# Patient Record
Sex: MALE | Race: WHITE | NOT HISPANIC OR LATINO | Employment: FULL TIME | URBAN - METROPOLITAN AREA
[De-identification: names, ages, dates, MRNs, and addresses within clinical notes are randomized per-mention and may not be internally consistent; named-entity substitution may affect disease eponyms.]

---

## 2024-08-09 ENCOUNTER — LAB (OUTPATIENT)
Dept: LAB | Facility: LAB | Age: 39
End: 2024-08-09

## 2024-08-09 ENCOUNTER — APPOINTMENT (OUTPATIENT)
Dept: PRIMARY CARE | Facility: CLINIC | Age: 39
End: 2024-08-09

## 2024-08-09 ENCOUNTER — HOSPITAL ENCOUNTER (OUTPATIENT)
Dept: RADIOLOGY | Facility: CLINIC | Age: 39
Discharge: HOME | End: 2024-08-09

## 2024-08-09 VITALS
OXYGEN SATURATION: 99 % | DIASTOLIC BLOOD PRESSURE: 104 MMHG | HEIGHT: 64 IN | RESPIRATION RATE: 15 BRPM | BODY MASS INDEX: 27.83 KG/M2 | HEART RATE: 85 BPM | SYSTOLIC BLOOD PRESSURE: 155 MMHG | TEMPERATURE: 97.3 F | WEIGHT: 163 LBS

## 2024-08-09 DIAGNOSIS — M54.50 ACUTE BILATERAL LOW BACK PAIN WITHOUT SCIATICA: ICD-10-CM

## 2024-08-09 DIAGNOSIS — N20.0 RENAL CALCULUS: ICD-10-CM

## 2024-08-09 DIAGNOSIS — R10.9 BILATERAL FLANK PAIN: ICD-10-CM

## 2024-08-09 DIAGNOSIS — Z87.19 HISTORY OF GASTROESOPHAGEAL REFLUX (GERD): ICD-10-CM

## 2024-08-09 DIAGNOSIS — Z00.00 ANNUAL PHYSICAL EXAM: Primary | ICD-10-CM

## 2024-08-09 DIAGNOSIS — Z00.00 ANNUAL PHYSICAL EXAM: ICD-10-CM

## 2024-08-09 DIAGNOSIS — R10.9 STOMACH PAIN: ICD-10-CM

## 2024-08-09 LAB
25(OH)D3 SERPL-MCNC: 35 NG/ML (ref 30–100)
ALBUMIN SERPL BCP-MCNC: 4.7 G/DL (ref 3.4–5)
ALP SERPL-CCNC: 53 U/L (ref 33–120)
ALT SERPL W P-5'-P-CCNC: 13 U/L (ref 10–52)
ANION GAP SERPL CALC-SCNC: 14 MMOL/L (ref 10–20)
APPEARANCE UR: CLEAR
AST SERPL W P-5'-P-CCNC: 14 U/L (ref 9–39)
BILIRUB SERPL-MCNC: 0.5 MG/DL (ref 0–1.2)
BILIRUB UR STRIP.AUTO-MCNC: NEGATIVE MG/DL
BUN SERPL-MCNC: 16 MG/DL (ref 6–23)
CALCIUM SERPL-MCNC: 9.8 MG/DL (ref 8.6–10.3)
CHLORIDE SERPL-SCNC: 104 MMOL/L (ref 98–107)
CHOLEST SERPL-MCNC: 223 MG/DL (ref 0–199)
CHOLESTEROL/HDL RATIO: 5.5
CO2 SERPL-SCNC: 28 MMOL/L (ref 21–32)
COLOR UR: ABNORMAL
CREAT SERPL-MCNC: 1.14 MG/DL (ref 0.5–1.3)
EGFRCR SERPLBLD CKD-EPI 2021: 83 ML/MIN/1.73M*2
ERYTHROCYTE [DISTWIDTH] IN BLOOD BY AUTOMATED COUNT: 12.9 % (ref 11.5–14.5)
EST. AVERAGE GLUCOSE BLD GHB EST-MCNC: 103 MG/DL
GLUCOSE SERPL-MCNC: 90 MG/DL (ref 74–99)
GLUCOSE UR STRIP.AUTO-MCNC: NORMAL MG/DL
HBA1C MFR BLD: 5.2 %
HCT VFR BLD AUTO: 45.9 % (ref 41–52)
HDLC SERPL-MCNC: 40.6 MG/DL
HGB BLD-MCNC: 15.6 G/DL (ref 13.5–17.5)
KETONES UR STRIP.AUTO-MCNC: ABNORMAL MG/DL
LDLC SERPL CALC-MCNC: 149 MG/DL
LEUKOCYTE ESTERASE UR QL STRIP.AUTO: NEGATIVE
MCH RBC QN AUTO: 29.2 PG (ref 26–34)
MCHC RBC AUTO-ENTMCNC: 34 G/DL (ref 32–36)
MCV RBC AUTO: 86 FL (ref 80–100)
NITRITE UR QL STRIP.AUTO: NEGATIVE
NON HDL CHOLESTEROL: 182 MG/DL (ref 0–149)
NRBC BLD-RTO: 0 /100 WBCS (ref 0–0)
PH UR STRIP.AUTO: 6 [PH]
PLATELET # BLD AUTO: 249 X10*3/UL (ref 150–450)
POTASSIUM SERPL-SCNC: 4.1 MMOL/L (ref 3.5–5.3)
PROT SERPL-MCNC: 7.3 G/DL (ref 6.4–8.2)
PROT UR STRIP.AUTO-MCNC: NEGATIVE MG/DL
RBC # BLD AUTO: 5.35 X10*6/UL (ref 4.5–5.9)
RBC # UR STRIP.AUTO: NEGATIVE /UL
SODIUM SERPL-SCNC: 142 MMOL/L (ref 136–145)
SP GR UR STRIP.AUTO: 1.01
TRIGL SERPL-MCNC: 167 MG/DL (ref 0–149)
TSH SERPL-ACNC: 3.64 MIU/L (ref 0.44–3.98)
UROBILINOGEN UR STRIP.AUTO-MCNC: NORMAL MG/DL
VLDL: 33 MG/DL (ref 0–40)
WBC # BLD AUTO: 4.4 X10*3/UL (ref 4.4–11.3)

## 2024-08-09 PROCEDURE — 84443 ASSAY THYROID STIM HORMONE: CPT

## 2024-08-09 PROCEDURE — 76770 US EXAM ABDO BACK WALL COMP: CPT

## 2024-08-09 PROCEDURE — 83036 HEMOGLOBIN GLYCOSYLATED A1C: CPT

## 2024-08-09 PROCEDURE — 1036F TOBACCO NON-USER: CPT

## 2024-08-09 PROCEDURE — 82306 VITAMIN D 25 HYDROXY: CPT

## 2024-08-09 PROCEDURE — 36415 COLL VENOUS BLD VENIPUNCTURE: CPT

## 2024-08-09 PROCEDURE — 85027 COMPLETE CBC AUTOMATED: CPT

## 2024-08-09 PROCEDURE — 3008F BODY MASS INDEX DOCD: CPT

## 2024-08-09 PROCEDURE — 80053 COMPREHEN METABOLIC PANEL: CPT

## 2024-08-09 PROCEDURE — 80061 LIPID PANEL: CPT

## 2024-08-09 PROCEDURE — 81003 URINALYSIS AUTO W/O SCOPE: CPT

## 2024-08-09 PROCEDURE — 99204 OFFICE O/P NEW MOD 45 MIN: CPT

## 2024-08-09 PROCEDURE — 99396 PREV VISIT EST AGE 40-64: CPT

## 2024-08-09 PROCEDURE — 83013 H PYLORI (C-13) BREATH: CPT

## 2024-08-09 SDOH — ECONOMIC STABILITY: FOOD INSECURITY: WITHIN THE PAST 12 MONTHS, THE FOOD YOU BOUGHT JUST DIDN'T LAST AND YOU DIDN'T HAVE MONEY TO GET MORE.: NEVER TRUE

## 2024-08-09 SDOH — ECONOMIC STABILITY: FOOD INSECURITY: WITHIN THE PAST 12 MONTHS, YOU WORRIED THAT YOUR FOOD WOULD RUN OUT BEFORE YOU GOT MONEY TO BUY MORE.: NEVER TRUE

## 2024-08-09 ASSESSMENT — ENCOUNTER SYMPTOMS
UNEXPECTED WEIGHT CHANGE: 1
EYES NEGATIVE: 1
VOMITING: 1
FLANK PAIN: 1
ENDOCRINE NEGATIVE: 1
CARDIOVASCULAR NEGATIVE: 1
ABDOMINAL PAIN: 1
HEMATOLOGIC/LYMPHATIC NEGATIVE: 1
ALLERGIC/IMMUNOLOGIC NEGATIVE: 1
NAUSEA: 1
FEVER: 0
RESPIRATORY NEGATIVE: 1
PSYCHIATRIC NEGATIVE: 1
NEUROLOGICAL NEGATIVE: 1

## 2024-08-09 ASSESSMENT — PATIENT HEALTH QUESTIONNAIRE - PHQ9
1. LITTLE INTEREST OR PLEASURE IN DOING THINGS: NOT AT ALL
SUM OF ALL RESPONSES TO PHQ9 QUESTIONS 1 & 2: 0
2. FEELING DOWN, DEPRESSED OR HOPELESS: NOT AT ALL

## 2024-08-09 ASSESSMENT — PAIN SCALES - GENERAL: PAINLEVEL: 0-NO PAIN

## 2024-08-09 ASSESSMENT — LIFESTYLE VARIABLES
HOW OFTEN DO YOU HAVE SIX OR MORE DRINKS ON ONE OCCASION: NEVER
AUDIT-C TOTAL SCORE: 0
HOW OFTEN DO YOU HAVE A DRINK CONTAINING ALCOHOL: NEVER
HOW MANY STANDARD DRINKS CONTAINING ALCOHOL DO YOU HAVE ON A TYPICAL DAY: PATIENT DOES NOT DRINK
SKIP TO QUESTIONS 9-10: 1

## 2024-08-09 NOTE — PATIENT INSTRUCTIONS
Thank you for coming to see me today.  If you have any questions or concerns following our visit, please contact the office.  Phone: [phone]    Follow up with me in 6 months    1)  Monitor your BP at home over the weekend and let me know what the readings were on Monday    2) Complete renal ultrasound and fasting lab work- I will reach out with resutls    3) I have placed referrals for GI and urology

## 2024-08-09 NOTE — PROGRESS NOTES
Subjective   Patient ID: Nishant Posadas is a 39 y.o. male who presents for Establish Care (Pain in back 1 month ) and Annual Exam.    HPI   Nishant presents today as a new patient to establish care and an annual exam. He has concerns today with his BP and lower back pain.   Bilateral flank pain: he endorses that this has been going on for a little over a month. He states that he has not been pain free since it started, but the severity comes in waves. He also endorses that he has had nausea and vomiting with this back pain and has lost a decent amount of weight within the past month due to vomiting and being afraid to eat due to causing vomiting. He went to  in [place] after this started and was told that it was most likely renal calculus, but did not have imaging completed. He denies any urinary problems, but did state that his urine was dark and had an unusual odor at the beginning of his symptoms. Denies saddle paraesthesia and urinary.bowel incontinence. Renal ultrasound and UA ordered today.  Abdominal pain, N/V: suspect is mostly due to possible renal calculus, but Nishant states he has history of acid reflux and that he was on Prilosec at one time. H. Pylori breath test ordered and referral placed for GI due to history.  Elevated BP: He endorses that he does have a history of elevated BP and was told when he was younger that he may eventually need medication- he has a home cuff and is agreeable to monitoring BP over the weekend and reporting readings back to me on Monday.    Routine screening labs ordered today as well. Routinely follow up in 6 months and sooner as needed.     Review of Systems   Constitutional:  Positive for unexpected weight change. Negative for fever.   HENT: Negative.     Eyes: Negative.    Respiratory: Negative.     Cardiovascular: Negative.    Gastrointestinal:  Positive for abdominal pain, nausea and vomiting.   Endocrine: Negative.    Genitourinary:  Positive for flank pain.   Skin:  "Negative.    Allergic/Immunologic: Negative.    Neurological: Negative.    Hematological: Negative.    Psychiatric/Behavioral: Negative.         Objective   BP (!) 155/104 (BP Location: Right arm, Patient Position: Sitting)   Pulse 85   Temp 36.3 °C (97.3 °F) (Temporal)   Resp 15   Ht 1.626 m (5' 4\")   Wt 73.9 kg (163 lb)   SpO2 99%   BMI 27.98 kg/m²     Physical Exam  HENT:      Right Ear: Tympanic membrane normal.      Left Ear: Tympanic membrane normal.   Cardiovascular:      Rate and Rhythm: Normal rate and regular rhythm.      Pulses: Normal pulses.      Heart sounds: Normal heart sounds.   Pulmonary:      Effort: Pulmonary effort is normal.      Breath sounds: Normal breath sounds.   Musculoskeletal:         General: Normal range of motion.      Lumbar back: Tenderness present.      Comments: Intermittent bilateral flank pain. 10/10 at worst, 4/10 at best   Skin:     General: Skin is warm and dry.      Capillary Refill: Capillary refill takes less than 2 seconds.   Neurological:      Mental Status: He is oriented to person, place, and time.   Psychiatric:         Mood and Affect: Mood normal.         Behavior: Behavior normal.         Thought Content: Thought content normal.         Judgment: Judgment normal.         Assessment/Plan   Problem List Items Addressed This Visit             ICD-10-CM    Annual physical exam - Primary Z00.00    Relevant Orders    CBC    Comprehensive Metabolic Panel    TSH with reflex to Free T4 if abnormal    Hemoglobin A1C    Lipid Panel    Vitamin D 25-Hydroxy,Total (for eval of Vitamin D levels)    Renal calculus N20.0    Relevant Orders    Referral to Urology    Bilateral flank pain R10.9    Relevant Orders    Urinalysis with Reflex Microscopic    Referral to Urology    History of gastroesophageal reflux (GERD) Z87.19    Relevant Orders    H. Pylori Breath Test     Other Visit Diagnoses         Codes    Acute bilateral low back pain without sciatica     M54.50    " Relevant Orders    US renal complete    Stomach pain     R10.9    Relevant Orders    Referral to Gastroenterology

## 2024-08-10 ENCOUNTER — TELEPHONE (OUTPATIENT)
Dept: PRIMARY CARE | Facility: CLINIC | Age: 39
End: 2024-08-10

## 2024-08-10 DIAGNOSIS — I10 PRIMARY HYPERTENSION: ICD-10-CM

## 2024-08-10 DIAGNOSIS — R10.84 GENERALIZED ABDOMINAL PAIN: Primary | ICD-10-CM

## 2024-08-10 DIAGNOSIS — R11.0 NAUSEA: ICD-10-CM

## 2024-08-10 LAB — UREA BREATH TEST QL: NEGATIVE

## 2024-08-10 RX ORDER — ONDANSETRON 8 MG/1
8 TABLET, ORALLY DISINTEGRATING ORAL EVERY 8 HOURS PRN
Qty: 20 TABLET | Refills: 0 | Status: SHIPPED | OUTPATIENT
Start: 2024-08-10 | End: 2024-08-17

## 2024-08-10 RX ORDER — DICYCLOMINE HYDROCHLORIDE 10 MG/1
10 CAPSULE ORAL 4 TIMES DAILY PRN
Qty: 30 CAPSULE | Refills: 0 | Status: SHIPPED | OUTPATIENT
Start: 2024-08-10 | End: 2024-10-09

## 2024-08-12 RX ORDER — LISINOPRIL 10 MG/1
10 TABLET ORAL DAILY
Qty: 90 TABLET | Refills: 3 | Status: SHIPPED | OUTPATIENT
Start: 2024-08-12 | End: 2025-08-07

## 2024-08-12 NOTE — TELEPHONE ENCOUNTER
Attempted to call patient on Saturday 8/10 with results of renal US- left message with no call back received.  I called Nishant's emergency contact, Vania, this morning and gave her results on lab work as well as renal ultrasound, and let her know I placed a stat order for CT abdomen/pelvis, as well as sent in prescriptions for zofran, bentyl, and lisinopril.  She endorsed that patient's BP was still elevated over the weekend (150s/100). Will recheck with medication therapy.  Vania had no further questions at this time.

## 2025-02-10 ENCOUNTER — APPOINTMENT (OUTPATIENT)
Dept: PRIMARY CARE | Facility: CLINIC | Age: 40
End: 2025-02-10

## 2025-06-06 ENCOUNTER — APPOINTMENT (OUTPATIENT)
Dept: PRIMARY CARE | Facility: CLINIC | Age: 40
End: 2025-06-06

## 2025-06-06 VITALS
HEART RATE: 85 BPM | HEIGHT: 64 IN | BODY MASS INDEX: 25.85 KG/M2 | WEIGHT: 151.4 LBS | SYSTOLIC BLOOD PRESSURE: 150 MMHG | RESPIRATION RATE: 18 BRPM | TEMPERATURE: 97.4 F | DIASTOLIC BLOOD PRESSURE: 90 MMHG | OXYGEN SATURATION: 99 %

## 2025-06-06 DIAGNOSIS — Z00.00 GENERAL MEDICAL EXAM: ICD-10-CM

## 2025-06-06 DIAGNOSIS — R10.84 GENERALIZED ABDOMINAL PAIN: Primary | ICD-10-CM

## 2025-06-06 DIAGNOSIS — R63.4 WEIGHT LOSS: ICD-10-CM

## 2025-06-06 DIAGNOSIS — Z87.19 HISTORY OF GASTROESOPHAGEAL REFLUX (GERD): ICD-10-CM

## 2025-06-06 DIAGNOSIS — K40.90 UNILATERAL INGUINAL HERNIA WITHOUT OBSTRUCTION OR GANGRENE, RECURRENCE NOT SPECIFIED: ICD-10-CM

## 2025-06-06 ASSESSMENT — PATIENT HEALTH QUESTIONNAIRE - PHQ9
SUM OF ALL RESPONSES TO PHQ9 QUESTIONS 1 AND 2: 0
1. LITTLE INTEREST OR PLEASURE IN DOING THINGS: NOT AT ALL
2. FEELING DOWN, DEPRESSED OR HOPELESS: NOT AT ALL

## 2025-06-06 NOTE — PROGRESS NOTES
"Subjective   Patient ID: Nishant Posadas is a 40 y.o. male who presents for Establish Care and Abdominal Pain (And back pain).    He reports right posterior mid back pain, and bouts of abdominal discomfort followed by nausea , and clear emesis sometimes. He has intolerance to fatty or fried food. No gluten sensitivity, lactose intolerance. He says he lost 35 to 40  Lbs weight in a year or so.   No fever, constipation, diarrhoea, GI bleeding , dysuria or heamturia . He had normal renal US last year. He was advised to see GI, but has not seen yet.   He has taken advil and tylenol for comfort.   He mentions left inguinal swelling , prominence for many months , which gets larger at times, no pain.        He was seeing SC[facility] , at [facility].    PMH : HPL , chronic abdominal pain, Gastritis, Reflux , weight loss    PSH : None    PH : denies smoking, rare alcohol use, no drug use  He works as  , does office work    FH : Father - HTN  GF - Pancreas , colon CA ,       Review of Systems  See HPI  Objective   /90 (BP Location: Left arm, Patient Position: Sitting, BP Cuff Size: Adult)   Pulse 85   Temp 36.3 °C (97.4 °F) (Temporal)   Resp 18   Ht 1.626 m (5' 4\")   Wt 68.7 kg (151 lb 6.4 oz)   SpO2 99%   BMI 25.99 kg/m²     Physical Exam  Constitutional:       Appearance: Normal appearance.   HENT:      Head: Normocephalic and atraumatic.      Right Ear: Tympanic membrane and external ear normal.      Left Ear: Tympanic membrane and external ear normal.      Nose: No congestion.      Mouth/Throat:      Mouth: Mucous membranes are moist.      Pharynx: Oropharynx is clear.   Eyes:      Extraocular Movements: Extraocular movements intact.      Conjunctiva/sclera: Conjunctivae normal.      Pupils: Pupils are equal, round, and reactive to light.   Neck:      Vascular: No carotid bruit.   Cardiovascular:      Rate and Rhythm: Normal rate and regular " rhythm.      Pulses: Normal pulses.      Heart sounds: Normal heart sounds. No murmur heard.  Pulmonary:      Effort: Pulmonary effort is normal.      Breath sounds: Normal breath sounds. No wheezing or rales.   Abdominal:      General: Abdomen is flat. Bowel sounds are normal. There is no distension.      Palpations: Abdomen is soft.      Tenderness: There is no abdominal tenderness. There is no right CVA tenderness, left CVA tenderness or guarding.      Comments: Left inguinal fullness / prominence , no clear hernia seen    Musculoskeletal:         General: No swelling or tenderness. Normal range of motion.      Cervical back: Normal range of motion and neck supple.      Right lower leg: No edema.      Left lower leg: No edema.   Skin:     General: Skin is warm and dry.      Capillary Refill: Capillary refill takes less than 2 seconds.      Findings: No rash.   Neurological:      General: No focal deficit present.      Mental Status: He is alert and oriented to person, place, and time.      Motor: No weakness.      Gait: Gait normal.   Psychiatric:         Mood and Affect: Mood normal.         Behavior: Behavior normal.         Assessment/Plan        Abdominal pain , chronic  Weight Loss  Inguinal prominence , Left    Check labs  Refer to GI at [facility], Dr Meneses  Refer to Surgery , Dr Guzman  Hydration , analgesics  Avoid certain foods , fatty foods, caffeine  CT scan of abdomen, future ( pending GI eval )

## 2025-06-10 NOTE — PROGRESS NOTES
"Subjective   Patient ID: Nishant Posadas is a 40 y.o. male who was referred by his PCP for New Patient Visit (Generalized abdominal pain/weight loss/nausea/emesis x 1 year.  Referred by Dr. Jimmie Teixeira.//).    Patient's PCP is Dr. Jimmie Teixeira    PMH: HTN    HPI  Patient is accompanied by his wife.    Patient states that for the past year he has been having issues with back pain. He states that the pain is located in the right lower flank. He states that the pain is constant, but then will have \"flare ups\" of pain. He states that when he has a \"flare\" he will then having centrally located abdominal pain, nausea, and non-bloody vomiting. He does feel that fatty foods tend to make the pain worse. He states that he has lost 30-40 pounds over the past year. He has cut a lot of foods out of his diet, particularly fatty foods. During episodes of severe pain, patient's wife has felt that he looked yellow. He denies any dark urine or pale stools. Patient feels like he urinates more than before. Patient reports a new left sided groin hernia, which tends to \"pop out\" with forceful vomiting and then is able to be reduced. He states that for years he has dealt with a red itchy rash between testicles and anus and has been using hydrocortisone cream. He states that he used to have heartburn, since child blount, but this has actually improved since cutting back foods from his diet.     CMP, CBC, TSH normal in August 2024.    Social History:  NSAIDs: Reports daily NSAID use   Tobacco: Denies   Alcohol: Socially  Drug use: Denies    Family History: Grandfather with colon and pancreatic cancer.     Prior GI evaluation:  None    Patient had an unremarkable renal US in August 2024    Review of Systems:  Constitutional: +30-40 pound unintentional weight loss over the past year.  Skin: No reported icterus, lesions, or rash.  Eye: No reported itching, pain, vision changes.  Ear: No reported discharge, hearing loss, or pain.  Nose: No reported " congestion, discharge, or epistaxis.  Mouth/throat: No reported dysphagia, hoarseness, or throat pain.  Resp: No reported cough, dyspnea, or wheezing.  Cardiovascular: No reported chest pain, lower extremity edema, or palpitations.   GI: +intermittent centrally located abdominal pain, N/V.  : +right flank pain, increased urination  Neuro: No reported confusion, memory loss, headaches, or dizziness.  Psych: No reported anxiety, depression, or insomnia.  Musculoskeletal: No reported arthralgia, joint swelling, or myalgias.  Heme/lymph: No reported easy bleeding or bruising, or swollen lymph nodes.  Endocrine: No reported cold/heat intolerance, polydipsia, or polyuria.     Medications:  Prior to Admission medications    Medication Sig Start Date End Date Taking? Authorizing Provider   lisinopril 10 mg tablet Take 1 tablet (10 mg) by mouth once daily.  Patient not taking: Reported on 6/6/2025 8/12/24 8/7/25  FARIDA Woodson-CNP       Allergies:  Patient has no known allergies.    Objective   Physical exam:  Constitutional: Well developed, well nourished 40 y.o. year old in no acute distress.   Skin: Warm and dry. Normal turgor. No rash, ulcer, trauma, jaundice.   Eyes: Pupils symmetric and reactive to light.  Respiratory: Clear to auscultation bilaterally. No wheezes, rhonchi, or rales heard.  Cardiovascular: Regular rate and rhythm. S1 and S2 appreciated and normal. No murmur, rub, or gallop heard.   Edema: No edema noted.  GI: Normal bowel sounds. Soft, non-distended, non-tender. No rebound or guarding present. No hepatomegaly or splenomegaly appreciated. Abdominal aorta not palpably abnormal.  : Denies right CVA tenderness  Musculoskeletal: Limbs and Joints grossly normal. Full range of motion in major joint.   Neuro: Alert and oriented x 3. Cranial nerves 2-12 grossly intact.   Psych: Normal mood and affect.        Relevant Results Recent labs reviewed in the EMR.    Radiology: Reviewed imaging  "reviewed in the EMR.  Imaging  No results found.    Cardiology, Vascular, and Other Imaging  No other imaging results found for the past 7 days      Assessment/Plan   Problem List Items Addressed This Visit           ICD-10-CM    Flank pain R10.9    Unclear etiology. He does have increased urination. If CT scan unrevealing, and GI work up negative, would recommend seeing urology.         Relevant Orders    Comprehensive metabolic panel    CT abdomen pelvis w IV contrast    Jaundice - Primary R17    None currently. Will check LFTs on CMP.         Relevant Orders    Comprehensive metabolic panel    CT abdomen pelvis w IV contrast    Nausea and vomiting R11.2    Relevant Orders    Comprehensive metabolic panel    CT abdomen pelvis w IV contrast    Weight loss R63.4    Patient has lost 30-40 pounds unintentionally over the past year. He has cut back on his diet, but weight loss seems more significant than cutting back on fatty foods, although patient does report consuming about 1000 calories a day. Had a az discussion with patient and his wife that it is important to evaluate for potential malignancy. Originally I was concerned about kidney, but patient had a normal renal US in August 2024 making that less likely. CT scan A/P was ordered along with CMP. I recommended EGD and colonoscopy for further evaluation, but patient declined. Discussed that CT scan is not sensitive for the GI tract and potential GI malignancy wouldn't be seen on CT scan and endoscopy would be recommended. He verbalizes understanding but wishes to have CT scan first.         Relevant Orders    Comprehensive metabolic panel    CT abdomen pelvis w IV contrast    Generalized abdominal pain R10.84    None today, occurs with \"attacks\". Discussed that differential is wide. I am concerned about possible biliary or pancreatic condition, including biliary colic, choledocolithiasis, cholangitis, pancreatic tumor, pancreatitis, PUD. Will start with CT scan " for further evaluation. I did recommend EGD and colonoscopy, patient declined until after CT scan.         Relevant Orders    Comprehensive metabolic panel    CT abdomen pelvis w IV contrast       Meds to hold: none  Yris Keane PA-C

## 2025-06-11 ENCOUNTER — APPOINTMENT (OUTPATIENT)
Facility: CLINIC | Age: 40
End: 2025-06-11
Payer: COMMERCIAL

## 2025-06-11 VITALS
SYSTOLIC BLOOD PRESSURE: 184 MMHG | OXYGEN SATURATION: 98 % | BODY MASS INDEX: 25.85 KG/M2 | HEART RATE: 88 BPM | HEIGHT: 64 IN | WEIGHT: 151.4 LBS | DIASTOLIC BLOOD PRESSURE: 92 MMHG

## 2025-06-11 DIAGNOSIS — R11.2 NAUSEA AND VOMITING, UNSPECIFIED VOMITING TYPE: ICD-10-CM

## 2025-06-11 DIAGNOSIS — R17 JAUNDICE: Primary | ICD-10-CM

## 2025-06-11 DIAGNOSIS — R10.84 GENERALIZED ABDOMINAL PAIN: ICD-10-CM

## 2025-06-11 DIAGNOSIS — R10.9 FLANK PAIN: ICD-10-CM

## 2025-06-11 DIAGNOSIS — R63.4 WEIGHT LOSS: ICD-10-CM

## 2025-06-11 PROCEDURE — 1036F TOBACCO NON-USER: CPT | Performed by: PHYSICIAN ASSISTANT

## 2025-06-11 PROCEDURE — 99205 OFFICE O/P NEW HI 60 MIN: CPT | Performed by: PHYSICIAN ASSISTANT

## 2025-06-11 PROCEDURE — 3008F BODY MASS INDEX DOCD: CPT | Performed by: PHYSICIAN ASSISTANT

## 2025-06-11 NOTE — ASSESSMENT & PLAN NOTE
Unclear etiology. He does have increased urination. If CT scan unrevealing, and GI work up negative, would recommend seeing urology.

## 2025-06-11 NOTE — ASSESSMENT & PLAN NOTE
Patient has lost 30-40 pounds unintentionally over the past year. He has cut back on his diet, but weight loss seems more significant than cutting back on fatty foods, although patient does report consuming about 1000 calories a day. Had a az discussion with patient and his wife that it is important to evaluate for potential malignancy. Originally I was concerned about kidney, but patient had a normal renal US in August 2024 making that less likely. CT scan A/P was ordered along with CMP. I recommended EGD and colonoscopy for further evaluation, but patient declined. Discussed that CT scan is not sensitive for the GI tract and potential GI malignancy wouldn't be seen on CT scan and endoscopy would be recommended. He verbalizes understanding but wishes to have CT scan first.

## 2025-06-11 NOTE — PATIENT INSTRUCTIONS
Thank you for coming in for your appointment.    -We will start with blood work and CT scan  -As we discussed, I would recommend an EGD and colonoscopy. Call if you wish to have this done    Call with questions or concerns.

## 2025-06-11 NOTE — ASSESSMENT & PLAN NOTE
"None today, occurs with \"attacks\". Discussed that differential is wide. I am concerned about possible biliary or pancreatic condition, including biliary colic, choledocolithiasis, cholangitis, pancreatic tumor, pancreatitis, PUD. Will start with CT scan for further evaluation. I did recommend EGD and colonoscopy, patient declined until after CT scan.  "

## 2025-06-11 NOTE — LETTER
"June 11, 2025     Jimmie Teixeira MD  [address] [facility] [address]  [facility] [address]    Patient: Nishant Posadas   YOB: 1985   Date of Visit: 6/11/2025       Dear Dr. Jimmie Teixeira MD:    Thank you for referring Nishant Posadas to me for evaluation. Below are my notes for this consultation.  If you have questions, please do not hesitate to call me. I look forward to following your patient along with you.       Sincerely,     Yris Keane PA-C      CC: No Recipients  ______________________________________________________________________________________    Subjective   Patient ID: Nishant Posadas is a 40 y.o. male who was referred by his PCP for New Patient Visit (Generalized abdominal pain/weight loss/nausea/emesis x 1 year.  Referred by Dr. Jimmie Teixeira.//).    Patient's PCP is Dr. Jimmie Teixeira    PMH: HTN    HPI  Patient is accompanied by his wife.    Patient states that for the past year he has been having issues with back pain. He states that the pain is located in the right lower flank. He states that the pain is constant, but then will have \"flare ups\" of pain. He states that when he has a \"flare\" he will then having centrally located abdominal pain, nausea, and non-bloody vomiting. He does feel that fatty foods tend to make the pain worse. He states that he has lost 30-40 pounds over the past year. He has cut a lot of foods out of his diet, particularly fatty foods. During episodes of severe pain, patient's wife has felt that he looked yellow. He denies any dark urine or pale stools. Patient feels like he urinates more than before. Patient reports a new left sided groin hernia, which tends to \"pop out\" with forceful vomiting and then is able to be reduced. He states that for years he has dealt with a red itchy rash between testicles and anus and has been using hydrocortisone cream. He states that he used to have heartburn, since child blount, but this has actually improved since cutting back foods from " his diet.     CMP, CBC, TSH normal in August 2024.    Social History:  NSAIDs: Reports daily NSAID use   Tobacco: Denies   Alcohol: Socially  Drug use: Denies    Family History: Grandfather with colon and pancreatic cancer.     Prior GI evaluation:  None    Patient had an unremarkable renal US in August 2024    Review of Systems:  Constitutional: +30-40 pound unintentional weight loss over the past year.  Skin: No reported icterus, lesions, or rash.  Eye: No reported itching, pain, vision changes.  Ear: No reported discharge, hearing loss, or pain.  Nose: No reported congestion, discharge, or epistaxis.  Mouth/throat: No reported dysphagia, hoarseness, or throat pain.  Resp: No reported cough, dyspnea, or wheezing.  Cardiovascular: No reported chest pain, lower extremity edema, or palpitations.   GI: +intermittent centrally located abdominal pain, N/V.  : +right flank pain, increased urination  Neuro: No reported confusion, memory loss, headaches, or dizziness.  Psych: No reported anxiety, depression, or insomnia.  Musculoskeletal: No reported arthralgia, joint swelling, or myalgias.  Heme/lymph: No reported easy bleeding or bruising, or swollen lymph nodes.  Endocrine: No reported cold/heat intolerance, polydipsia, or polyuria.     Medications:  Prior to Admission medications    Medication Sig Start Date End Date Taking? Authorizing Provider   lisinopril 10 mg tablet Take 1 tablet (10 mg) by mouth once daily.  Patient not taking: Reported on 6/6/2025 8/12/24 8/7/25  FARIDA Woodson-CNP       Allergies:  Patient has no known allergies.    Objective   Physical exam:  Constitutional: Well developed, well nourished 40 y.o. year old in no acute distress.   Skin: Warm and dry. Normal turgor. No rash, ulcer, trauma, jaundice.   Eyes: Pupils symmetric and reactive to light.  Respiratory: Clear to auscultation bilaterally. No wheezes, rhonchi, or rales heard.  Cardiovascular: Regular rate and rhythm. S1 and  S2 appreciated and normal. No murmur, rub, or gallop heard.   Edema: No edema noted.  GI: Normal bowel sounds. Soft, non-distended, non-tender. No rebound or guarding present. No hepatomegaly or splenomegaly appreciated. Abdominal aorta not palpably abnormal.  : Denies right CVA tenderness  Musculoskeletal: Limbs and Joints grossly normal. Full range of motion in major joint.   Neuro: Alert and oriented x 3. Cranial nerves 2-12 grossly intact.   Psych: Normal mood and affect.        Relevant Results Recent labs reviewed in the EMR.    Radiology: Reviewed imaging reviewed in the EMR.  Imaging  No results found.    Cardiology, Vascular, and Other Imaging  No other imaging results found for the past 7 days      Assessment/Plan   Problem List Items Addressed This Visit           ICD-10-CM    Flank pain R10.9    Unclear etiology. He does have increased urination. If CT scan unrevealing, and GI work up negative, would recommend seeing urology.         Relevant Orders    Comprehensive metabolic panel    CT abdomen pelvis w IV contrast    Jaundice - Primary R17    None currently. Will check LFTs on CMP.         Relevant Orders    Comprehensive metabolic panel    CT abdomen pelvis w IV contrast    Nausea and vomiting R11.2    Relevant Orders    Comprehensive metabolic panel    CT abdomen pelvis w IV contrast    Weight loss R63.4    Patient has lost 30-40 pounds unintentionally over the past year. He has cut back on his diet, but weight loss seems more significant than cutting back on fatty foods, although patient does report consuming about 1000 calories a day. Had a az discussion with patient and his wife that it is important to evaluate for potential malignancy. Originally I was concerned about kidney, but patient had a normal renal US in August 2024 making that less likely. CT scan A/P was ordered along with CMP. I recommended EGD and colonoscopy for further evaluation, but patient declined. Discussed that CT scan is  "not sensitive for the GI tract and potential GI malignancy wouldn't be seen on CT scan and endoscopy would be recommended. He verbalizes understanding but wishes to have CT scan first.         Relevant Orders    Comprehensive metabolic panel    CT abdomen pelvis w IV contrast    Generalized abdominal pain R10.84    None today, occurs with \"attacks\". Discussed that differential is wide. I am concerned about possible biliary or pancreatic condition, including biliary colic, choledocolithiasis, cholangitis, pancreatic tumor, pancreatitis, PUD. Will start with CT scan for further evaluation. I did recommend EGD and colonoscopy, patient declined until after CT scan.         Relevant Orders    Comprehensive metabolic panel    CT abdomen pelvis w IV contrast       Meds to hold: none  Yris Keane PA-C    "

## 2025-06-20 ENCOUNTER — APPOINTMENT (OUTPATIENT)
Dept: RADIOLOGY | Facility: HOSPITAL | Age: 40
End: 2025-06-20
Payer: COMMERCIAL

## 2025-06-20 DIAGNOSIS — R10.84 GENERALIZED ABDOMINAL PAIN: ICD-10-CM

## 2025-06-20 DIAGNOSIS — R17 JAUNDICE: ICD-10-CM

## 2025-06-20 DIAGNOSIS — R63.4 WEIGHT LOSS: ICD-10-CM

## 2025-06-20 DIAGNOSIS — R11.2 NAUSEA AND VOMITING, UNSPECIFIED VOMITING TYPE: ICD-10-CM

## 2025-06-20 DIAGNOSIS — R10.9 FLANK PAIN: ICD-10-CM

## 2025-06-20 LAB
ALBUMIN SERPL-MCNC: 5.2 G/DL (ref 3.6–5.1)
ALP SERPL-CCNC: 62 U/L (ref 36–130)
ALT SERPL-CCNC: 22 U/L (ref 9–46)
ANION GAP SERPL CALCULATED.4IONS-SCNC: 16 MMOL/L (CALC) (ref 7–17)
APPEARANCE UR: CLEAR
AST SERPL-CCNC: 22 U/L (ref 10–40)
BACTERIA #/AREA URNS HPF: ABNORMAL /HPF
BASOPHILS # BLD AUTO: 29 CELLS/UL (ref 0–200)
BASOPHILS NFR BLD AUTO: 0.6 %
BILIRUB SERPL-MCNC: 0.5 MG/DL (ref 0.2–1.2)
BILIRUB UR QL STRIP: NEGATIVE
BUN SERPL-MCNC: 21 MG/DL (ref 7–25)
CALCIUM SERPL-MCNC: 9.7 MG/DL (ref 8.6–10.3)
CHLORIDE SERPL-SCNC: 103 MMOL/L (ref 98–110)
CHOLEST SERPL-MCNC: 210 MG/DL
CHOLEST/HDLC SERPL: 4.5 (CALC)
CO2 SERPL-SCNC: 23 MMOL/L (ref 20–32)
COLOR UR: YELLOW
CREAT SERPL-MCNC: 1.22 MG/DL (ref 0.6–1.29)
EGFRCR SERPLBLD CKD-EPI 2021: 76 ML/MIN/1.73M2
EOSINOPHIL # BLD AUTO: 49 CELLS/UL (ref 15–500)
EOSINOPHIL NFR BLD AUTO: 1 %
ERYTHROCYTE [DISTWIDTH] IN BLOOD BY AUTOMATED COUNT: 13.5 % (ref 11–15)
EST. AVERAGE GLUCOSE BLD GHB EST-MCNC: 97 MG/DL
EST. AVERAGE GLUCOSE BLD GHB EST-SCNC: 5.4 MMOL/L
GLUCOSE SERPL-MCNC: 90 MG/DL (ref 65–99)
GLUCOSE UR QL STRIP: NEGATIVE
HBA1C MFR BLD: 5 %
HCT VFR BLD AUTO: 46.4 % (ref 38.5–50)
HDLC SERPL-MCNC: 47 MG/DL
HGB BLD-MCNC: 15.5 G/DL (ref 13.2–17.1)
HGB UR QL STRIP: ABNORMAL
HYALINE CASTS #/AREA URNS LPF: ABNORMAL /LPF
KETONES UR QL STRIP: NEGATIVE
LDLC SERPL CALC-MCNC: 139 MG/DL (CALC)
LEUKOCYTE ESTERASE UR QL STRIP: ABNORMAL
LIPASE SERPL-CCNC: 45 U/L (ref 7–60)
LYMPHOCYTES # BLD AUTO: 1323 CELLS/UL (ref 850–3900)
LYMPHOCYTES NFR BLD AUTO: 27 %
MCH RBC QN AUTO: 29.8 PG (ref 27–33)
MCHC RBC AUTO-ENTMCNC: 33.4 G/DL (ref 32–36)
MCV RBC AUTO: 89.2 FL (ref 80–100)
MONOCYTES # BLD AUTO: 250 CELLS/UL (ref 200–950)
MONOCYTES NFR BLD AUTO: 5.1 %
NEUTROPHILS # BLD AUTO: 3249 CELLS/UL (ref 1500–7800)
NEUTROPHILS NFR BLD AUTO: 66.3 %
NITRITE UR QL STRIP: NEGATIVE
NONHDLC SERPL-MCNC: 163 MG/DL (CALC)
PH UR STRIP: 7.5 [PH] (ref 5–8)
PLATELET # BLD AUTO: 260 THOUSAND/UL (ref 140–400)
PMV BLD REES-ECKER: 11.6 FL (ref 7.5–12.5)
POTASSIUM SERPL-SCNC: 4 MMOL/L (ref 3.5–5.3)
PROT SERPL-MCNC: 8.2 G/DL (ref 6.1–8.1)
PROT UR QL STRIP: NEGATIVE
PSA SERPL-MCNC: 1.83 NG/ML
RBC # BLD AUTO: 5.2 MILLION/UL (ref 4.2–5.8)
RBC #/AREA URNS HPF: ABNORMAL /HPF
SERVICE CMNT-IMP: ABNORMAL
SODIUM SERPL-SCNC: 142 MMOL/L (ref 135–146)
SP GR UR STRIP: 1.01 (ref 1–1.03)
SQUAMOUS #/AREA URNS HPF: ABNORMAL /HPF
TRIGL SERPL-MCNC: 119 MG/DL
WBC # BLD AUTO: 4.9 THOUSAND/UL (ref 3.8–10.8)
WBC #/AREA URNS HPF: ABNORMAL /HPF

## 2025-06-20 PROCEDURE — 2550000001 HC RX 255 CONTRASTS: Performed by: INTERNAL MEDICINE

## 2025-06-20 PROCEDURE — 74177 CT ABD & PELVIS W/CONTRAST: CPT | Performed by: STUDENT IN AN ORGANIZED HEALTH CARE EDUCATION/TRAINING PROGRAM

## 2025-06-20 PROCEDURE — 74177 CT ABD & PELVIS W/CONTRAST: CPT

## 2025-06-20 RX ADMIN — IOHEXOL 75 ML: 350 INJECTION, SOLUTION INTRAVENOUS at 12:55

## 2025-06-23 DIAGNOSIS — N21.1 URETHRAL CALCULUS: Primary | ICD-10-CM

## 2025-06-27 ENCOUNTER — HOSPITAL ENCOUNTER (OUTPATIENT)
Facility: HOSPITAL | Age: 40
Setting detail: OUTPATIENT SURGERY
End: 2025-06-27
Attending: UROLOGY | Admitting: UROLOGY
Payer: COMMERCIAL

## 2025-06-27 ENCOUNTER — OFFICE VISIT (OUTPATIENT)
Dept: UROLOGY | Facility: CLINIC | Age: 40
End: 2025-06-27
Payer: COMMERCIAL

## 2025-06-27 DIAGNOSIS — N20.0 KIDNEY STONES: ICD-10-CM

## 2025-06-27 LAB
POC APPEARANCE, URINE: CLEAR
POC BILIRUBIN, URINE: NEGATIVE
POC BLOOD, URINE: ABNORMAL
POC COLOR, URINE: YELLOW
POC GLUCOSE, URINE: NEGATIVE MG/DL
POC KETONES, URINE: NEGATIVE MG/DL
POC LEUKOCYTES, URINE: NEGATIVE
POC NITRITE,URINE: NEGATIVE
POC PH, URINE: 7.5 PH
POC PROTEIN, URINE: NEGATIVE MG/DL
POC SPECIFIC GRAVITY, URINE: 1.02
POC UROBILINOGEN, URINE: 0.2 EU/DL

## 2025-06-27 PROCEDURE — 81003 URINALYSIS AUTO W/O SCOPE: CPT | Performed by: UROLOGY

## 2025-06-27 PROCEDURE — 99204 OFFICE O/P NEW MOD 45 MIN: CPT | Performed by: UROLOGY

## 2025-06-27 PROCEDURE — 1036F TOBACCO NON-USER: CPT | Performed by: UROLOGY

## 2025-06-27 NOTE — PROGRESS NOTES
06/27/2025  40-year-old gentleman presents 1 year history of intermittent right flank pain, currently mild discomfort      CT scan abdomen and pelvis resolved demonstrates: 8 mm right distal ureteral stone with mild hydronephrosis    I talked to patient about options for the kidney stones, #1 watchful waiting, #2 surgical intervention, cystoscopy ureteroscopy holmium laser lithotripsy and stent insertion. All questions were answered, patient expressed understanding and agreed to the plan.    Impression  Right flank pain  Right ureteral stone  Left kidney stone    Plan:  Cystoscopy ureteroscopy holmium laser lithotripsy and right stent insertion;  May address left kidney stone later    Chief Complaint   Patient presents with    Nephrolithiasis     Pt is here today for an obstructing kidney stone and mild hydronephrosis. He denies any voiding issues at this time.         Physical Exam     TODAYS LAB RESULTS:  Lab Results   Component Value Date    PSA 1.83 06/19/2025     === 06/20/25 ===    CT ABDOMEN PELVIS W IV CONTRAST    - Impression -  1.  8 mm distal right ureteral obstructing calculus with mild  hydroureteronephrosis and delayed right nephrogram. Additional 10 mm  left inferior pole nonobstructing calculus.  2. Severe left and moderate right hip osteoarthritis.      Signed by: Otf Castaneda 6/21/2025 9:28 PM  Dictation workstation:   [id number]    ntains abnormal data POCT UA Automated manually resulted  Order: [id number]   Status: Final result       Next appt: 08/06/2025 at 09:40 AM in [facility] (Jimmie Teixeira MD)       Dx: Kidney stones    Test Result Released: Yes (not seen)    0 Result Notes      Component  Ref Range & Units 08:14   POC Color, Urine  Straw, Yellow, Light-Yellow Yellow   POC Appearance, Urine  Clear Clear   POC Glucose, Urine  NEGATIVE mg/dl NEGATIVE   POC Bilirubin, Urine  NEGATIVE NEGATIVE   POC Ketones, Urine  NEGATIVE mg/dl NEGATIVE   POC Specific Gravity, Urine  1.005 - 1.035  1.020   POC Blood, Urine  NEGATIVE TRACE-Intact Abnormal    POC PH, Urine  No Reference Range Established PH 7.5   POC Protein, Urine  NEGATIVE mg/dl NEGATIVE   POC Urobilinogen, Urine  0.2, 1.0 EU/DL 0.2   Poc Nitrite, Urine  NEGATIVE NEGATIVE   POC Leukocytes, Urine  NEGATIVE NEGATIVE             Specimen Collected: 06/27/25 08:14 Last Resulted: 06/27/25 08:14         ASSESSMENT&PLAN:      IMPRESSIONS:

## 2025-06-27 NOTE — PATIENT INSTRUCTIONS
Impression  Right flank pain  Right ureteral stone  Left kidney stone    Plan:  Cystoscopy ureteroscopy holmium laser lithotripsy and right stent insertion;  May address left kidney stone later

## 2025-07-02 ENCOUNTER — TELEPHONE (OUTPATIENT)
Dept: UROLOGY | Facility: CLINIC | Age: 40
End: 2025-07-02
Payer: COMMERCIAL

## 2025-07-02 DIAGNOSIS — N20.0 KIDNEY STONES: Primary | ICD-10-CM

## 2025-07-02 RX ORDER — CIPROFLOXACIN 500 MG/1
500 TABLET, FILM COATED ORAL 2 TIMES DAILY
Qty: 14 TABLET | Refills: 0 | Status: SHIPPED | OUTPATIENT
Start: 2025-07-02 | End: 2025-07-09

## 2025-07-02 NOTE — TELEPHONE ENCOUNTER
Patient scheduled for surgery next Tuesday w/ 8mm stone.  He is getting symptoms of poss UTI, burning, frequency, little bit of pain.  He wasn't sure if could be stone or if he should have antibiotic please  [facility] in [place]  Thank you

## 2025-07-03 ENCOUNTER — TELEPHONE (OUTPATIENT)
Dept: UROLOGY | Facility: CLINIC | Age: 40
End: 2025-07-03
Payer: COMMERCIAL

## 2025-07-03 ENCOUNTER — ANESTHESIA EVENT (OUTPATIENT)
Dept: OPERATING ROOM | Facility: HOSPITAL | Age: 40
End: 2025-07-03

## 2025-07-03 DIAGNOSIS — R30.0 DYSURIA: ICD-10-CM

## 2025-07-03 RX ORDER — SODIUM CHLORIDE, SODIUM LACTATE, POTASSIUM CHLORIDE, CALCIUM CHLORIDE 600; 310; 30; 20 MG/100ML; MG/100ML; MG/100ML; MG/100ML
100 INJECTION, SOLUTION INTRAVENOUS CONTINUOUS
OUTPATIENT
Start: 2025-07-03 | End: 2025-07-04

## 2025-07-03 RX ORDER — ALBUTEROL SULFATE 0.83 MG/ML
2.5 SOLUTION RESPIRATORY (INHALATION) ONCE AS NEEDED
OUTPATIENT
Start: 2025-07-03

## 2025-07-03 RX ORDER — DIPHENHYDRAMINE HYDROCHLORIDE 50 MG/ML
12.5 INJECTION, SOLUTION INTRAMUSCULAR; INTRAVENOUS ONCE AS NEEDED
OUTPATIENT
Start: 2025-07-03

## 2025-07-03 RX ORDER — ONDANSETRON HYDROCHLORIDE 2 MG/ML
4 INJECTION, SOLUTION INTRAVENOUS ONCE AS NEEDED
OUTPATIENT
Start: 2025-07-03

## 2025-07-03 RX ORDER — LABETALOL HYDROCHLORIDE 5 MG/ML
5 INJECTION, SOLUTION INTRAVENOUS ONCE AS NEEDED
OUTPATIENT
Start: 2025-07-03

## 2025-07-03 RX ORDER — MORPHINE SULFATE 2 MG/ML
2 INJECTION, SOLUTION INTRAMUSCULAR; INTRAVENOUS EVERY 5 MIN PRN
OUTPATIENT
Start: 2025-07-03

## 2025-07-03 RX ORDER — OXYCODONE AND ACETAMINOPHEN 5; 325 MG/1; MG/1
1 TABLET ORAL EVERY 4 HOURS PRN
Refills: 0 | OUTPATIENT
Start: 2025-07-03

## 2025-07-03 RX ORDER — MEPERIDINE HYDROCHLORIDE 25 MG/ML
12.5 INJECTION INTRAMUSCULAR; INTRAVENOUS; SUBCUTANEOUS EVERY 10 MIN PRN
OUTPATIENT
Start: 2025-07-03

## 2025-07-03 RX ORDER — HYDRALAZINE HYDROCHLORIDE 20 MG/ML
5 INJECTION INTRAMUSCULAR; INTRAVENOUS EVERY 30 MIN PRN
OUTPATIENT
Start: 2025-07-03

## 2025-07-03 RX ORDER — DROPERIDOL 2.5 MG/ML
0.62 INJECTION, SOLUTION INTRAMUSCULAR; INTRAVENOUS ONCE AS NEEDED
OUTPATIENT
Start: 2025-07-03

## 2025-07-03 RX ORDER — SODIUM CHLORIDE, SODIUM LACTATE, POTASSIUM CHLORIDE, CALCIUM CHLORIDE 600; 310; 30; 20 MG/100ML; MG/100ML; MG/100ML; MG/100ML
75 INJECTION, SOLUTION INTRAVENOUS CONTINUOUS
OUTPATIENT
Start: 2025-07-03 | End: 2025-07-04

## 2025-07-03 RX ORDER — FAMOTIDINE 10 MG/ML
20 INJECTION, SOLUTION INTRAVENOUS ONCE
OUTPATIENT
Start: 2025-07-03 | End: 2025-07-03

## 2025-07-03 RX ORDER — LIDOCAINE HYDROCHLORIDE 10 MG/ML
0.1 INJECTION, SOLUTION EPIDURAL; INFILTRATION; INTRACAUDAL; PERINEURAL ONCE
OUTPATIENT
Start: 2025-07-03 | End: 2025-07-03

## 2025-07-03 NOTE — TELEPHONE ENCOUNTER
Nishant Posadas [birth year]   pt has many questions before his surgery on Tuesday. He would like a UA test done asap because he has UTI symptoms and was given antibiotic, but he wanted something else and would like to talk to a nurse please.  Pts pharmacy is [facility] in [place]. Than you

## 2025-07-08 ENCOUNTER — ANESTHESIA (OUTPATIENT)
Dept: OPERATING ROOM | Facility: HOSPITAL | Age: 40
End: 2025-07-08

## 2025-07-09 ENCOUNTER — APPOINTMENT (OUTPATIENT)
Facility: CLINIC | Age: 40
End: 2025-07-09
Payer: COMMERCIAL

## 2025-08-06 ENCOUNTER — APPOINTMENT (OUTPATIENT)
Dept: PRIMARY CARE | Facility: CLINIC | Age: 40
End: 2025-08-06
Payer: COMMERCIAL

## 2025-08-19 ENCOUNTER — APPOINTMENT (OUTPATIENT)
Dept: UROLOGY | Facility: CLINIC | Age: 40
End: 2025-08-19
Payer: COMMERCIAL